# Patient Record
Sex: MALE | Race: WHITE | NOT HISPANIC OR LATINO | Employment: FULL TIME | URBAN - METROPOLITAN AREA
[De-identification: names, ages, dates, MRNs, and addresses within clinical notes are randomized per-mention and may not be internally consistent; named-entity substitution may affect disease eponyms.]

---

## 2019-08-20 ENCOUNTER — OFFICE VISIT (OUTPATIENT)
Dept: UROLOGY | Facility: AMBULATORY SURGERY CENTER | Age: 59
End: 2019-08-20
Payer: COMMERCIAL

## 2019-08-20 VITALS
BODY MASS INDEX: 33.46 KG/M2 | HEIGHT: 71 IN | WEIGHT: 239 LBS | SYSTOLIC BLOOD PRESSURE: 120 MMHG | DIASTOLIC BLOOD PRESSURE: 90 MMHG | HEART RATE: 80 BPM

## 2019-08-20 DIAGNOSIS — R31.29 MICROHEMATURIA: Primary | ICD-10-CM

## 2019-08-20 DIAGNOSIS — R35.1 BPH ASSOCIATED WITH NOCTURIA: ICD-10-CM

## 2019-08-20 DIAGNOSIS — N40.1 BPH ASSOCIATED WITH NOCTURIA: ICD-10-CM

## 2019-08-20 LAB
BACTERIA UR QL AUTO: ABNORMAL /HPF
BILIRUB UR QL STRIP: NEGATIVE
CLARITY UR: CLEAR
COLOR UR: YELLOW
GLUCOSE UR STRIP-MCNC: ABNORMAL MG/DL
HGB UR QL STRIP.AUTO: NEGATIVE
HYALINE CASTS #/AREA URNS LPF: ABNORMAL /LPF
KETONES UR STRIP-MCNC: NEGATIVE MG/DL
LEUKOCYTE ESTERASE UR QL STRIP: ABNORMAL
NITRITE UR QL STRIP: NEGATIVE
NON-SQ EPI CELLS URNS QL MICRO: ABNORMAL /HPF
PH UR STRIP.AUTO: 6 [PH]
PROT UR STRIP-MCNC: ABNORMAL MG/DL
RBC #/AREA URNS AUTO: ABNORMAL /HPF
SL AMB  POCT GLUCOSE, UA: 2000
SL AMB LEUKOCYTE ESTERASE,UA: NORMAL
SL AMB POCT BILIRUBIN,UA: NORMAL
SL AMB POCT BLOOD,UA: NORMAL
SL AMB POCT CLARITY,UA: CLEAR
SL AMB POCT COLOR,UA: YELLOW
SL AMB POCT KETONES,UA: NORMAL
SL AMB POCT NITRITE,UA: NORMAL
SL AMB POCT PH,UA: 5
SL AMB POCT SPECIFIC GRAVITY,UA: 1.02
SL AMB POCT URINE PROTEIN: 30
SL AMB POCT UROBILINOGEN: NORMAL
SP GR UR STRIP.AUTO: 1.04 (ref 1–1.03)
UROBILINOGEN UR QL STRIP.AUTO: 0.2 E.U./DL
WBC #/AREA URNS AUTO: ABNORMAL /HPF

## 2019-08-20 PROCEDURE — 81001 URINALYSIS AUTO W/SCOPE: CPT | Performed by: NURSE PRACTITIONER

## 2019-08-20 PROCEDURE — 81002 URINALYSIS NONAUTO W/O SCOPE: CPT | Performed by: NURSE PRACTITIONER

## 2019-08-20 PROCEDURE — 87086 URINE CULTURE/COLONY COUNT: CPT | Performed by: NURSE PRACTITIONER

## 2019-08-20 PROCEDURE — 99202 OFFICE O/P NEW SF 15 MIN: CPT | Performed by: NURSE PRACTITIONER

## 2019-08-20 RX ORDER — LISINOPRIL 10 MG/1
TABLET ORAL
COMMUNITY
Start: 2011-05-20

## 2019-08-20 RX ORDER — SIMVASTATIN 10 MG
TABLET ORAL
COMMUNITY
Start: 2011-07-03

## 2019-08-20 RX ORDER — PIOGLITAZONEHYDROCHLORIDE 45 MG/1
45 TABLET ORAL DAILY
COMMUNITY

## 2019-08-20 RX ORDER — TAMSULOSIN HYDROCHLORIDE 0.4 MG/1
0.4 CAPSULE ORAL
Qty: 90 CAPSULE | Refills: 1 | Status: SHIPPED | OUTPATIENT
Start: 2019-08-20 | End: 2019-09-24 | Stop reason: SDUPTHER

## 2019-08-20 NOTE — PATIENT INSTRUCTIONS
Flomax 0 4 mg daily- take in the evening  Continue with maintaining hydration  Call the office for concerning symptoms

## 2019-08-20 NOTE — PROGRESS NOTES
8/20/2019    Chief Complaint   Patient presents with    Microhematuria     Assessment and Plan    63 y o  male managed by Dr Eugenia Gann    1  Microscopic hematuria  · Urinalysis with microscopy and urine culture ordered  · Pending results of urine testing will escalate care to CT of abdomen and pelvis versus ultrasound    2  Benign prostatic hyperplasia with lower urinary tract symptoms  · Digital rectal exam reveals prostate size of approximately 45 g  Smooth, nontender, symmetrical, no nodules noted  · Tamsulosin 0 4 mg p o  Daily- prescription provided  · Bladder scan PVR on next office visit    3  Routine prostate cancer screening  · Patient reports PSA obtained by his PCP  · Digital rectal exam as above    History of Present Illness  Clarissa Houston is a 63 y o  male here for evaluation of  questionable microscopic hematuria and benign prostatic hyperplasia with lower urinary tract symptoms  Patient has a complaint of urinary frequency, urgency and nocturia  Patient reports getting up approximately 5-6 times at night to urinate  Patient denies dysuria, hematuria and sensation of incomplete bladder emptying  Patient reports urine testing positive for microscopic hematuria performed by PCP  He reports that he was sent here for further evaluation  Will send urinalysis with microscopy and urine culture  Patient agreeable to this plan  Patient denies smoking, ETOH and drug use/abuse  Patient denies a familial history of urothelial carcinomas  Patient does report a history of hypertension and diabetes for which he is currently treated for with lisinopril, metformin, Januvia and pioglitazone, respectively  Review of Systems   Constitutional: Negative for chills and fever  Respiratory: Negative for cough and shortness of breath  Cardiovascular: Negative for chest pain  Gastrointestinal: Negative for abdominal distention, abdominal pain, blood in stool, nausea and vomiting     Genitourinary: Positive for frequency and urgency  Negative for difficulty urinating, dysuria, enuresis, flank pain and hematuria  Musculoskeletal: Negative for back pain  Skin: Negative for rash  Neurological: Negative for dizziness  Urinary Incontinence Screening      Most Recent Value   Urinary Incontinence   Urinary Incontinence? No   Incomplete emptying? No   Urinary frequency? Yes   Urinary urgency? Yes   Urinary hesitancy? No   Dysuria (painful difficult urination)? No   Nocturia (waking up to use the bathroom)? Yes [every 2 hours]   Straining (having to push to go)? No   Weak stream?  Yes   Intermittent stream?  No   Post void dribbling? No          AUA SYMPTOM SCORE      Most Recent Value   AUA SYMPTOM SCORE   How often have you had a sensation of not emptying your bladder completely after you finished urinating? 3   How often have you had to urinate again less than two hours after you finished urinating? 4   How often have you found you stopped and started again several times when you urinate? 2   How often have you found it difficult to postpone urination? 0   How often have you had a weak urinary stream?  4   How often have you had to push or strain to begin urination? 0   How many times did you most typically get up to urinate from the time you went to bed at night until the time you got up in the morning? 4   Quality of Life: If you were to spend the rest of your life with your urinary condition just the way it is now, how would you feel about that?  4   AUA SYMPTOM SCORE  17         Past Medical History  History reviewed  No pertinent past medical history  Past Social History  History reviewed  No pertinent surgical history  Social History     Tobacco Use   Smoking Status Never Smoker   Smokeless Tobacco Former User    Types: Chew       Past Family History  History reviewed  No pertinent family history      Past Social history  Social History     Socioeconomic History    Marital status: /Civil Union     Spouse name: Not on file    Number of children: Not on file    Years of education: Not on file    Highest education level: Not on file   Occupational History    Not on file   Social Needs    Financial resource strain: Not on file    Food insecurity:     Worry: Not on file     Inability: Not on file    Transportation needs:     Medical: Not on file     Non-medical: Not on file   Tobacco Use    Smoking status: Never Smoker    Smokeless tobacco: Former User     Types: Chew   Substance and Sexual Activity    Alcohol use: Not on file    Drug use: Not on file    Sexual activity: Not on file   Lifestyle    Physical activity:     Days per week: Not on file     Minutes per session: Not on file    Stress: Not on file   Relationships    Social connections:     Talks on phone: Not on file     Gets together: Not on file     Attends Orthodoxy service: Not on file     Active member of club or organization: Not on file     Attends meetings of clubs or organizations: Not on file     Relationship status: Not on file    Intimate partner violence:     Fear of current or ex partner: Not on file     Emotionally abused: Not on file     Physically abused: Not on file     Forced sexual activity: Not on file   Other Topics Concern    Not on file   Social History Narrative    Not on file       Current Medications  Current Outpatient Medications   Medication Sig Dispense Refill    lisinopril (ZESTRIL) 10 mg tablet Take by mouth      metFORMIN (GLUCOPHAGE) 500 mg tablet Take by mouth      pioglitazone (ACTOS) 45 mg tablet Take 45 mg by mouth daily      simvastatin (ZOCOR) 10 mg tablet Take by mouth      sitaGLIPtin (JANUVIA) 50 mg tablet Take 50 mg by mouth daily      tamsulosin (FLOMAX) 0 4 mg Take 1 capsule (0 4 mg total) by mouth daily with dinner 90 capsule 1     No current facility-administered medications for this visit          Allergies  No Known Allergies      The following portions of the patient's history were reviewed and updated as appropriate: allergies, current medications, past medical history, past social history, past surgical history and problem list       Vitals  Vitals:    08/20/19 0937   BP: 120/90   Pulse: 80   Weight: 108 kg (239 lb)   Height: 5' 11" (1 803 m)     Physical Exam  Physical Exam   Constitutional: He is oriented to person, place, and time  He appears well-developed and well-nourished  No distress  HENT:   Head: Atraumatic  Eyes: EOM are normal    Neck: Normal range of motion  Neck supple  Cardiovascular: Normal rate and regular rhythm  No murmur heard  Pulmonary/Chest: Effort normal and breath sounds normal  No respiratory distress  Genitourinary: Rectum normal  Rectal exam shows no external hemorrhoid and no tenderness  Prostate is enlarged  Prostate is not tender  Genitourinary Comments: Digital rectal exam reveals a prostate size of approximately 45 g, smooth, firm, symmetrical   No nodules noted  Musculoskeletal: Normal range of motion  Neurological: He is alert and oriented to person, place, and time  Skin: Skin is warm and dry  Psychiatric: He has a normal mood and affect  Vitals reviewed  Results  No results found for this or any previous visit (from the past 1 hour(s))    No results found for: PSA  No results found for: GLUCOSE, CALCIUM, NA, K, CO2, CL, BUN, CREATININE  No results found for: WBC, HGB, HCT, MCV, PLT    Orders  Orders Placed This Encounter   Procedures    Urine culture    Urinalysis with microscopic    POCT urine dip       Tamiko SamplesKUMAR

## 2019-08-21 LAB — BACTERIA UR CULT: NORMAL

## 2019-09-13 LAB
APPEARANCE UR: CLEAR
BACTERIA UR QL AUTO: ABNORMAL /HPF
BILIRUB UR QL STRIP: NEGATIVE
COLOR UR: YELLOW
GLUCOSE UR QL STRIP: ABNORMAL
HGB UR QL STRIP: NEGATIVE
HYALINE CASTS #/AREA URNS LPF: ABNORMAL /LPF
KETONES UR QL STRIP: NEGATIVE
LEUKOCYTE ESTERASE UR QL STRIP: NEGATIVE
NITRITE UR QL STRIP: NEGATIVE
PH UR STRIP: 6.5 [PH] (ref 5–8)
PROT UR QL STRIP: NEGATIVE
RBC #/AREA URNS HPF: ABNORMAL /HPF
SP GR UR STRIP: 1.01 (ref 1–1.03)
SQUAMOUS #/AREA URNS HPF: ABNORMAL /HPF
WBC #/AREA URNS HPF: ABNORMAL /HPF

## 2019-09-24 ENCOUNTER — OFFICE VISIT (OUTPATIENT)
Dept: UROLOGY | Facility: AMBULATORY SURGERY CENTER | Age: 59
End: 2019-09-24
Payer: COMMERCIAL

## 2019-09-24 VITALS
WEIGHT: 244 LBS | HEART RATE: 74 BPM | HEIGHT: 71 IN | SYSTOLIC BLOOD PRESSURE: 114 MMHG | BODY MASS INDEX: 34.16 KG/M2 | DIASTOLIC BLOOD PRESSURE: 68 MMHG

## 2019-09-24 DIAGNOSIS — R35.1 BPH ASSOCIATED WITH NOCTURIA: ICD-10-CM

## 2019-09-24 DIAGNOSIS — N40.1 BPH ASSOCIATED WITH NOCTURIA: ICD-10-CM

## 2019-09-24 DIAGNOSIS — Z12.5 PROSTATE CANCER SCREENING: ICD-10-CM

## 2019-09-24 DIAGNOSIS — R31.29 MICROHEMATURIA: Primary | ICD-10-CM

## 2019-09-24 LAB — POST-VOID RESIDUAL VOLUME, ML POC: 165 ML

## 2019-09-24 PROCEDURE — 99213 OFFICE O/P EST LOW 20 MIN: CPT | Performed by: NURSE PRACTITIONER

## 2019-09-24 PROCEDURE — 51798 US URINE CAPACITY MEASURE: CPT | Performed by: NURSE PRACTITIONER

## 2019-09-24 RX ORDER — TAMSULOSIN HYDROCHLORIDE 0.4 MG/1
0.4 CAPSULE ORAL
Qty: 90 CAPSULE | Refills: 3 | Status: SHIPPED | OUTPATIENT
Start: 2019-09-24 | End: 2020-04-02 | Stop reason: SDUPTHER

## 2019-09-24 NOTE — PROGRESS NOTES
9/24/2019      Chief Complaint   Patient presents with    Microhematuria     6 wk f/u- UA and UC done 9/12/19    Benign Prostatic Hypertrophy     Assessment and Plan    63 y o  male managed by Dr Chaya Mack    1  Microscopic hematuria  · Urinalysis with microscopy reveals no RBCs, UA and a negative blood  · Urine dip in office reveals  · No need to escalate care to CT scan of abdomen pelvis ultrasound at this time due to negative to blood on microscopy    2  Benign prostatic hyperplasia with lower urinary tract symptoms  · Tamsulosin 0 4 p o  Daily  · Bladder scan  ml  · Bladder scan PVR and uroflow at next office visit    3  Routine prostate cancer screening  · Prostate evaluation performed a prior office visit patient due for repeat PSA and SHEILA 09/2020    History of Present Illness  Marko Matthew is a 63 y o  male here for follow up evaluation of  questionable microscopic hematuria and benign prostatic hyperplasia with lower urinary tract symptoms  Patient has a complaint of urinary frequency, urgency and nocturia  Patient reports getting up approximately 5-6 times at night to urinate  Patient denies dysuria, hematuria and sensation of incomplete bladder emptying  Patient denies smoking, ETOH and drug use/abuse  Patient denies a familial history of urothelial carcinomas  Patient does report a history of hypertension and diabetes for which he is currently treated for with lisinopril, metformin, Januvia and pioglitazone, respectively  At his last office visit he was started on tamsulosin 0 4 mg p o  Daily  Patient reports significant resolution of urinary symptoms  He reports getting up approximately 1 time a night to urinate reports that over significant reduction in the amount of urinary frequency and urgency throughout the day and reports complete bladder emptying with urinating  He is very pleased with his urinary symptoms at this time      Review of Systems   Constitutional: Negative for chills and fever  Respiratory: Negative for cough and shortness of breath  Cardiovascular: Negative for chest pain  Gastrointestinal: Negative for abdominal distention, abdominal pain, blood in stool, nausea and vomiting  Genitourinary: Negative for difficulty urinating, dysuria, enuresis, flank pain, frequency, hematuria and urgency  Musculoskeletal: Negative for back pain  Skin: Negative for rash  Neurological: Negative for dizziness, syncope and light-headedness  Past Medical History  History reviewed  No pertinent past medical history  Past Social History  History reviewed  No pertinent surgical history  Social History     Tobacco Use   Smoking Status Never Smoker   Smokeless Tobacco Former User    Types: Chew       Past Family History  History reviewed  No pertinent family history      Past Social history  Social History     Socioeconomic History    Marital status: /Civil Union     Spouse name: Not on file    Number of children: Not on file    Years of education: Not on file    Highest education level: Not on file   Occupational History    Not on file   Social Needs    Financial resource strain: Not on file    Food insecurity:     Worry: Not on file     Inability: Not on file    Transportation needs:     Medical: Not on file     Non-medical: Not on file   Tobacco Use    Smoking status: Never Smoker    Smokeless tobacco: Former User     Types: Chew   Substance and Sexual Activity    Alcohol use: Not on file    Drug use: Not on file    Sexual activity: Not on file   Lifestyle    Physical activity:     Days per week: Not on file     Minutes per session: Not on file    Stress: Not on file   Relationships    Social connections:     Talks on phone: Not on file     Gets together: Not on file     Attends Latter day service: Not on file     Active member of club or organization: Not on file     Attends meetings of clubs or organizations: Not on file     Relationship status: Not on file    Intimate partner violence:     Fear of current or ex partner: Not on file     Emotionally abused: Not on file     Physically abused: Not on file     Forced sexual activity: Not on file   Other Topics Concern    Not on file   Social History Narrative    Not on file       Current Medications  Current Outpatient Medications   Medication Sig Dispense Refill    lisinopril (ZESTRIL) 10 mg tablet Take by mouth      metFORMIN (GLUCOPHAGE) 500 mg tablet Take by mouth      pioglitazone (ACTOS) 45 mg tablet Take 45 mg by mouth daily      simvastatin (ZOCOR) 10 mg tablet Take by mouth      sitaGLIPtin (JANUVIA) 50 mg tablet Take 50 mg by mouth daily      tamsulosin (FLOMAX) 0 4 mg Take 1 capsule (0 4 mg total) by mouth daily with dinner 90 capsule 1     No current facility-administered medications for this visit  Allergies  No Known Allergies      The following portions of the patient's history were reviewed and updated as appropriate: allergies, current medications, past medical history, past social history, past surgical history and problem list       Vitals  Vitals:    09/24/19 0837   BP: 114/68   BP Location: Left arm   Patient Position: Sitting   Cuff Size: Large   Pulse: 74   Weight: 111 kg (244 lb)   Height: 5' 11" (1 803 m)           Physical Exam  Physical Exam   Constitutional: He is oriented to person, place, and time  He appears well-developed and well-nourished  HENT:   Head: Atraumatic  Eyes: EOM are normal    Neck: Neck supple  Pulmonary/Chest: Effort normal  No respiratory distress  Musculoskeletal: Normal range of motion  Neurological: He is alert and oriented to person, place, and time  Skin: Skin is warm and dry  Psychiatric: He has a normal mood and affect  Vitals reviewed      Results  Recent Results (from the past 1 hour(s))   POCT Measure PVR    Collection Time: 09/24/19  8:36 AM   Result Value Ref Range    POST-VOID RESIDUAL VOLUME, ML  mL     No results found for: PSA  No results found for: GLUCOSE, CALCIUM, NA, K, CO2, CL, BUN, CREATININE  No results found for: WBC, HGB, HCT, MCV, PLT    Orders  Orders Placed This Encounter   Procedures    POCT Measure PVR       KUMAR Callejas

## 2020-04-02 DIAGNOSIS — N40.1 BPH ASSOCIATED WITH NOCTURIA: ICD-10-CM

## 2020-04-02 DIAGNOSIS — R35.1 BPH ASSOCIATED WITH NOCTURIA: ICD-10-CM

## 2020-04-02 RX ORDER — TAMSULOSIN HYDROCHLORIDE 0.4 MG/1
0.4 CAPSULE ORAL
Qty: 90 CAPSULE | Refills: 2 | Status: SHIPPED | OUTPATIENT
Start: 2020-04-02 | End: 2020-09-21 | Stop reason: SDUPTHER

## 2020-09-13 LAB — PSA SERPL-MCNC: 2.2 NG/ML

## 2020-09-21 ENCOUNTER — OFFICE VISIT (OUTPATIENT)
Dept: UROLOGY | Facility: AMBULATORY SURGERY CENTER | Age: 60
End: 2020-09-21
Payer: COMMERCIAL

## 2020-09-21 VITALS
HEIGHT: 71 IN | BODY MASS INDEX: 35.59 KG/M2 | TEMPERATURE: 98.4 F | HEART RATE: 78 BPM | DIASTOLIC BLOOD PRESSURE: 90 MMHG | WEIGHT: 254.2 LBS | SYSTOLIC BLOOD PRESSURE: 138 MMHG

## 2020-09-21 DIAGNOSIS — R35.1 BPH ASSOCIATED WITH NOCTURIA: Primary | ICD-10-CM

## 2020-09-21 DIAGNOSIS — Z12.5 PROSTATE CANCER SCREENING: ICD-10-CM

## 2020-09-21 DIAGNOSIS — N40.1 BPH ASSOCIATED WITH NOCTURIA: Primary | ICD-10-CM

## 2020-09-21 LAB
POST-VOID RESIDUAL VOLUME, ML POC: 14 ML
SL AMB  POCT GLUCOSE, UA: NORMAL
SL AMB LEUKOCYTE ESTERASE,UA: NORMAL
SL AMB POCT BILIRUBIN,UA: NORMAL
SL AMB POCT BLOOD,UA: NORMAL
SL AMB POCT CLARITY,UA: CLEAR
SL AMB POCT COLOR,UA: YELLOW
SL AMB POCT KETONES,UA: NORMAL
SL AMB POCT NITRITE,UA: NORMAL
SL AMB POCT PH,UA: 6
SL AMB POCT SPECIFIC GRAVITY,UA: 1.03
SL AMB POCT URINE PROTEIN: NORMAL
SL AMB POCT UROBILINOGEN: 0.2

## 2020-09-21 PROCEDURE — 81002 URINALYSIS NONAUTO W/O SCOPE: CPT | Performed by: NURSE PRACTITIONER

## 2020-09-21 PROCEDURE — 99213 OFFICE O/P EST LOW 20 MIN: CPT | Performed by: NURSE PRACTITIONER

## 2020-09-21 PROCEDURE — 51798 US URINE CAPACITY MEASURE: CPT | Performed by: NURSE PRACTITIONER

## 2020-09-21 RX ORDER — TAMSULOSIN HYDROCHLORIDE 0.4 MG/1
0.4 CAPSULE ORAL
Qty: 90 CAPSULE | Refills: 3 | Status: SHIPPED | OUTPATIENT
Start: 2020-09-21 | End: 2021-03-15

## 2020-09-21 NOTE — PROGRESS NOTES
9/21/2020      Chief Complaint   Patient presents with    Benign Prostatic Hypertrophy    Microhematuria       Assessment and Plan    62 y o  male managed by Dr Brower Breath    1  Benign prostatic hyperplasia  · Bladder scan PVR 14 mL  · Urine dip unremarkable  · Continue tamsulosin 0 4 mg p o  Daily-prescription refill provided  · Bladder scan PVR and uroflow at next office visit  · Follow up in the office in 1 year    2  Prostate cancer screening  · PSA performed 09/12/2020 resulted 2 2  · SHEILA reveals a prostate approximately 40 g smooth nontender  No nodules noted  Symmetrical  · Repeat PSA and SHEILA in 1 year        History of Present Illness  Francine Summers is a 62 y o  male here for follow up evaluation of  benign prostatic hyperplasia with lower urinary tract symptoms   Patient has a complaint of urinary frequency, urgency and nocturia   Patient reports getting up approximately 5-6 times at night to urinate   Patient denies dysuria, hematuria and sensation of incomplete bladder emptying   Patient denies smoking, ETOH and drug use/abuse   Patient denies a familial history of urothelial carcinomas   Patient does report a history of hypertension and diabetes for which he is currently treated for with lisinopril, metformin, Januvia and pioglitazone, respectively  At his last office visit he was started on tamsulosin 0 4 mg p o  Daily  Patient reports significant resolution of urinary symptoms  He reports getting up approximately 0-1 time a night to urinate He denies urinary frequency and urgency throughout the day and reports complete bladder emptying with urinating  He is very pleased with his urinary symptoms at this time  Review of Systems   Constitutional: Negative for chills and fever  Respiratory: Negative for cough and shortness of breath  Cardiovascular: Negative for chest pain  Gastrointestinal: Negative for abdominal distention, abdominal pain, blood in stool, nausea and vomiting  Genitourinary: Negative for difficulty urinating, dysuria, enuresis, flank pain, frequency, hematuria and urgency  Skin: Negative for rash  Urinary Incontinence Screening      Most Recent Value   Urinary Incontinence   Urinary Incontinence? No   Incomplete emptying? No   Urinary frequency? Yes   Urinary urgency? No   Urinary hesitancy? No   Dysuria (painful difficult urination)? No   Nocturia (waking up to use the bathroom)? Yes [1x]   Straining (having to push to go)? No   Weak stream?  Yes   Intermittent stream?  No          AUA SYMPTOM SCORE      Most Recent Value   AUA SYMPTOM SCORE   How often have you had a sensation of not emptying your bladder completely after you finished urinating? 0   How often have you had to urinate again less than two hours after you finished urinating? 1   How often have you found you stopped and started again several times when you urinate?  0   How often have you found it difficult to postpone urination? 0   How often have you had a weak urinary stream?  1   How often have you had to push or strain to begin urination? 0   How many times did you most typically get up to urinate from the time you went to bed at night until the time you got up in the morning? 1   Quality of Life: If you were to spend the rest of your life with your urinary condition just the way it is now, how would you feel about that?  1   AUA SYMPTOM SCORE  3             Past Medical History  History reviewed  No pertinent past medical history  Past Social History  History reviewed  No pertinent surgical history  Social History     Tobacco Use   Smoking Status Never Smoker   Smokeless Tobacco Former User    Types: Chew       Past Family History  History reviewed  No pertinent family history      Past Social history  Social History     Socioeconomic History    Marital status: /Civil Union     Spouse name: Not on file    Number of children: Not on file    Years of education: Not on file    Highest education level: Not on file   Occupational History    Not on file   Social Needs    Financial resource strain: Not on file    Food insecurity     Worry: Not on file     Inability: Not on file    Transportation needs     Medical: Not on file     Non-medical: Not on file   Tobacco Use    Smoking status: Never Smoker    Smokeless tobacco: Former User     Types: Chew   Substance and Sexual Activity    Alcohol use: Not on file    Drug use: Not on file    Sexual activity: Not on file   Lifestyle    Physical activity     Days per week: Not on file     Minutes per session: Not on file    Stress: Not on file   Relationships    Social connections     Talks on phone: Not on file     Gets together: Not on file     Attends Anglican service: Not on file     Active member of club or organization: Not on file     Attends meetings of clubs or organizations: Not on file     Relationship status: Not on file    Intimate partner violence     Fear of current or ex partner: Not on file     Emotionally abused: Not on file     Physically abused: Not on file     Forced sexual activity: Not on file   Other Topics Concern    Not on file   Social History Narrative    Not on file       Current Medications  Current Outpatient Medications   Medication Sig Dispense Refill    lisinopril (ZESTRIL) 10 mg tablet Take by mouth      metFORMIN (GLUCOPHAGE) 500 mg tablet Take by mouth      pioglitazone (ACTOS) 45 mg tablet Take 45 mg by mouth daily      simvastatin (ZOCOR) 10 mg tablet Take by mouth      sitaGLIPtin (JANUVIA) 50 mg tablet Take 50 mg by mouth daily      tamsulosin (FLOMAX) 0 4 mg Take 1 capsule (0 4 mg total) by mouth daily with dinner 90 capsule 2     No current facility-administered medications for this visit          Allergies  No Known Allergies      The following portions of the patient's history were reviewed and updated as appropriate: allergies, current medications, past medical history, past social history, past surgical history and problem list       Vitals  Vitals:    09/21/20 0845   BP: 138/90   BP Location: Left arm   Patient Position: Sitting   Cuff Size: Adult   Pulse: 78   Temp: 98 4 °F (36 9 °C)   Weight: 115 kg (254 lb 3 2 oz)   Height: 5' 11" (1 803 m)     Physical Exam  Physical Exam  Vitals signs reviewed  Constitutional:       General: He is not in acute distress  Appearance: Normal appearance  He is normal weight  HENT:      Head: Normocephalic  Eyes:      Pupils: Pupils are equal, round, and reactive to light  Cardiovascular:      Rate and Rhythm: Normal rate  Pulmonary:      Effort: No respiratory distress  Breath sounds: Normal breath sounds  Genitourinary:     Comments: SHEILA reveals a prostate size of approximately 40 g smooth nontender  No nodules noted  Skin:     General: Skin is warm and dry  Neurological:      General: No focal deficit present  Mental Status: He is alert and oriented to person, place, and time     Psychiatric:         Mood and Affect: Mood normal          Behavior: Behavior normal            Results  Recent Results (from the past 1 hour(s))   POCT Measure PVR    Collection Time: 09/21/20  8:38 AM   Result Value Ref Range    POST-VOID RESIDUAL VOLUME, ML POC 14 mL   POCT urine dip    Collection Time: 09/21/20  8:44 AM   Result Value Ref Range    LEUKOCYTE ESTERASE,UA -     NITRITE,UA -     SL AMB POCT UROBILINOGEN 0 2     POCT URINE PROTEIN -      PH,UA 6 0     BLOOD,UA -     SPECIFIC GRAVITY,UA 1 030     KETONES,UA -     BILIRUBIN,UA -     GLUCOSE, UA -      COLOR,UA yellow     CLARITY,UA clear    ]  Lab Results   Component Value Date    PSA 2 2 09/12/2020     No results found for: GLUCOSE, CALCIUM, NA, K, CO2, CL, BUN, CREATININE  No results found for: WBC, HGB, HCT, MCV, PLT    Orders  Orders Placed This Encounter   Procedures    POCT Measure PVR    POCT urine dip       KUMAR Stark

## 2021-03-15 DIAGNOSIS — N40.1 BPH ASSOCIATED WITH NOCTURIA: ICD-10-CM

## 2021-03-15 DIAGNOSIS — R35.1 BPH ASSOCIATED WITH NOCTURIA: ICD-10-CM

## 2021-03-15 RX ORDER — TAMSULOSIN HYDROCHLORIDE 0.4 MG/1
CAPSULE ORAL
Qty: 90 CAPSULE | Refills: 2 | Status: SHIPPED | OUTPATIENT
Start: 2021-03-15 | End: 2021-12-02 | Stop reason: SDUPTHER

## 2021-09-12 LAB — PSA SERPL-MCNC: 1.6 NG/ML

## 2021-09-22 ENCOUNTER — OFFICE VISIT (OUTPATIENT)
Dept: UROLOGY | Facility: AMBULATORY SURGERY CENTER | Age: 61
End: 2021-09-22
Payer: COMMERCIAL

## 2021-09-22 VITALS
HEART RATE: 80 BPM | BODY MASS INDEX: 36.82 KG/M2 | DIASTOLIC BLOOD PRESSURE: 82 MMHG | SYSTOLIC BLOOD PRESSURE: 128 MMHG | WEIGHT: 263 LBS | HEIGHT: 71 IN

## 2021-09-22 DIAGNOSIS — R35.1 BPH ASSOCIATED WITH NOCTURIA: Primary | ICD-10-CM

## 2021-09-22 DIAGNOSIS — N40.1 BPH ASSOCIATED WITH NOCTURIA: Primary | ICD-10-CM

## 2021-09-22 DIAGNOSIS — Z12.5 PROSTATE CANCER SCREENING: ICD-10-CM

## 2021-09-22 LAB — POST-VOID RESIDUAL VOLUME, ML POC: 72 ML

## 2021-09-22 PROCEDURE — 99213 OFFICE O/P EST LOW 20 MIN: CPT | Performed by: NURSE PRACTITIONER

## 2021-09-22 PROCEDURE — 51798 US URINE CAPACITY MEASURE: CPT | Performed by: NURSE PRACTITIONER

## 2021-09-22 RX ORDER — SIMVASTATIN 10 MG
TABLET ORAL
COMMUNITY
Start: 2021-08-15

## 2021-09-22 RX ORDER — LISINOPRIL 10 MG/1
TABLET ORAL
COMMUNITY
Start: 2021-08-15

## 2021-09-22 NOTE — PROGRESS NOTES
9/22/2021    Assessment and Plan    62 y o  male managed by Dr Daisha Yap    1  Benign prostatic hyperplasia  ·  bladder scan PVR 72 ml  ·  continue tamsulosin- prescription refill not needed at this time  ·  continue dietary behavior modifications to reduce irritative bladder symptoms  ·  with continue to monitor for progression/worsening of symptoms    2  Prostate cancer screening  ·  PSA performed 09/11/2021 resulted 1 6  ·  SHEILA- prostate 45 g smooth nontender  No nodules noted  ·  repeat PSA and SHEILA in 1 year  ·  follow up in the office in 1 year    History of Present Illness  Lyndon Durbin is a 62 y o  male here for follow up evaluation of benign prostatic hyperplasia with lower urinary tract symptoms   Patient has a complaint of urinary frequency, urgency and nocturia   Patient reports getting up approximately 5-6 times at night to urinate   Patient denies dysuria, hematuria and sensation of incomplete bladder emptying   Patient denies smoking, ETOH and drug use/abuse   Patient denies a familial history of urothelial carcinomas   Patient does report a history of hypertension and diabetes for which he is currently treated for with lisinopril, metformin, Januvia and pioglitazone, respectively   At his last office visit he was started on tamsulosin 0 4 mg p o  Daily   Patient reports significant resolution of urinary symptoms   He reports getting up approximately 0-1 time a night to urinate He denies urinary frequency and urgency throughout the day and reports complete bladder emptying with urinating  Og Oropeza is very pleased with his urinary symptoms at this time  Component PSA, Total   Latest Ref Rng & Units < OR = 4 0 ng/mL   9/12/2020 2 2   9/11/2021 1 6       Review of Systems   Constitutional: Negative for chills and fever  Respiratory: Negative for cough and shortness of breath  Cardiovascular: Negative for chest pain     Gastrointestinal: Negative for abdominal distention, abdominal pain, blood in stool, nausea and vomiting  Genitourinary: Negative for difficulty urinating, dysuria, enuresis, flank pain, frequency, hematuria and urgency  Skin: Negative for rash  Past Medical History  History reviewed  No pertinent past medical history  Past Social History  History reviewed  No pertinent surgical history  Social History     Tobacco Use   Smoking Status Never Smoker   Smokeless Tobacco Former User    Types: Chew       Past Family History  History reviewed  No pertinent family history  Past Social history  Social History     Socioeconomic History    Marital status: /Civil Union     Spouse name: Not on file    Number of children: Not on file    Years of education: Not on file    Highest education level: Not on file   Occupational History    Not on file   Tobacco Use    Smoking status: Never Smoker    Smokeless tobacco: Former User     Types: Chew   Substance and Sexual Activity    Alcohol use: Not on file    Drug use: Not on file    Sexual activity: Not on file   Other Topics Concern    Not on file   Social History Narrative    Not on file     Social Determinants of Health     Financial Resource Strain:     Difficulty of Paying Living Expenses:    Food Insecurity:     Worried About [address] in the Last Year:     [address] in the Last Year:    Transportation Needs:     Lack of Transportation (Medical):      Lack of Transportation (Non-Medical):    Physical Activity:     Days of Exercise per Week:     Minutes of Exercise per Session:    Stress:     Feeling of Stress :    Social Connections:     Frequency of Communication with Friends and Family:     Frequency of Social Gatherings with Friends and Family:     Attends Sikh Services:     Active Member of Clubs or Organizations:     Attends Club or Organization Meetings:     Marital Status:    Intimate Partner Violence:     Fear of Current or Ex-Partner:     Emotionally Abused:     Physically Abused:     Sexually Abused:        Current Medications  Current Outpatient Medications   Medication Sig Dispense Refill    lisinopril (ZESTRIL) 10 mg tablet Take by mouth      lisinopril (ZESTRIL) 10 mg tablet TAKE 1 TABLET ONCE DAILY      metFORMIN (GLUCOPHAGE) 500 mg tablet Take by mouth      metFORMIN (GLUCOPHAGE) 500 mg tablet TAKE 1 TABLET TWICE A DAY      pioglitazone (ACTOS) 45 mg tablet Take 45 mg by mouth daily      simvastatin (ZOCOR) 10 mg tablet Take by mouth      simvastatin (Zocor) 10 mg tablet TAKE 1 TABLET ONCE DAILY      sitaGLIPtin (JANUVIA) 50 mg tablet Take 50 mg by mouth daily      tamsulosin (FLOMAX) 0 4 mg TAKE 1 CAPSULE DAILY WITH  DINNER 90 capsule 2     No current facility-administered medications for this visit  Allergies  No Known Allergies      The following portions of the patient's history were reviewed and updated as appropriate: allergies, current medications, past medical history, past social history, past surgical history and problem list       Vitals  Vitals:    09/22/21 0809   BP: 128/82   Pulse: 80   Weight: 119 kg (263 lb)   Height: 5' 11" (1 803 m)           Physical Exam  Physical Exam  Vitals reviewed  Constitutional:       General: He is not in acute distress  Appearance: Normal appearance  He is normal weight  HENT:      Head: Normocephalic  Eyes:      Pupils: Pupils are equal, round, and reactive to light  Cardiovascular:      Rate and Rhythm: Normal rate  Pulmonary:      Effort: No respiratory distress  Breath sounds: Normal breath sounds  Genitourinary:     Penis: Normal        Testes: Normal       Comments:  SHEILA -prostate 45 g smooth nontender  No nodules noted  Skin:     General: Skin is warm and dry  Neurological:      General: No focal deficit present  Mental Status: He is alert and oriented to person, place, and time     Psychiatric:         Mood and Affect: Mood normal          Behavior: Behavior normal  Results  Recent Results (from the past 1 hour(s))   POCT Measure PVR    Collection Time: 09/22/21  8:06 AM   Result Value Ref Range    POST-VOID RESIDUAL VOLUME, ML POC 72 mL   ]  Lab Results   Component Value Date    PSA 1 6 09/11/2021    PSA 2 2 09/12/2020     No results found for: GLUCOSE, CALCIUM, NA, K, CO2, CL, BUN, CREATININE  No results found for: WBC, HGB, HCT, MCV, PLT        Orders  Orders Placed This Encounter   Procedures    PSA, Total Screen     This is a patient instruction: This test is non-fasting  Please drink two glasses of water morning of bloodwork          Standing Status:   Future     Standing Expiration Date:   3/22/2023   Suzie Burks POCT Measure PVR       KUMAR Muhammad

## 2021-12-02 DIAGNOSIS — R35.1 BPH ASSOCIATED WITH NOCTURIA: ICD-10-CM

## 2021-12-02 DIAGNOSIS — N40.1 BPH ASSOCIATED WITH NOCTURIA: ICD-10-CM

## 2021-12-02 RX ORDER — TAMSULOSIN HYDROCHLORIDE 0.4 MG/1
CAPSULE ORAL
Qty: 90 CAPSULE | Refills: 2 | Status: SHIPPED | OUTPATIENT
Start: 2021-12-02 | End: 2022-01-07 | Stop reason: SDUPTHER

## 2022-01-07 DIAGNOSIS — R35.1 BPH ASSOCIATED WITH NOCTURIA: ICD-10-CM

## 2022-01-07 DIAGNOSIS — N40.1 BPH ASSOCIATED WITH NOCTURIA: ICD-10-CM

## 2022-01-07 RX ORDER — TAMSULOSIN HYDROCHLORIDE 0.4 MG/1
0.4 CAPSULE ORAL
Qty: 90 CAPSULE | Refills: 0 | Status: SHIPPED | OUTPATIENT
Start: 2022-01-07 | End: 2022-07-06

## 2022-07-06 DIAGNOSIS — N40.1 BPH ASSOCIATED WITH NOCTURIA: ICD-10-CM

## 2022-07-06 DIAGNOSIS — R35.1 BPH ASSOCIATED WITH NOCTURIA: ICD-10-CM

## 2022-07-06 RX ORDER — TAMSULOSIN HYDROCHLORIDE 0.4 MG/1
CAPSULE ORAL
Qty: 90 CAPSULE | Refills: 0 | Status: SHIPPED | OUTPATIENT
Start: 2022-07-06 | End: 2022-10-11

## 2022-09-18 LAB — PSA SERPL-MCNC: 1.71 NG/ML

## 2022-10-10 DIAGNOSIS — N40.1 BPH ASSOCIATED WITH NOCTURIA: ICD-10-CM

## 2022-10-10 DIAGNOSIS — R35.1 BPH ASSOCIATED WITH NOCTURIA: ICD-10-CM

## 2022-10-11 ENCOUNTER — OFFICE VISIT (OUTPATIENT)
Dept: UROLOGY | Facility: AMBULATORY SURGERY CENTER | Age: 62
End: 2022-10-11

## 2022-10-11 VITALS
HEART RATE: 82 BPM | OXYGEN SATURATION: 98 % | HEIGHT: 71 IN | WEIGHT: 263 LBS | SYSTOLIC BLOOD PRESSURE: 132 MMHG | RESPIRATION RATE: 18 BRPM | DIASTOLIC BLOOD PRESSURE: 86 MMHG | BODY MASS INDEX: 36.82 KG/M2

## 2022-10-11 DIAGNOSIS — R35.1 BPH ASSOCIATED WITH NOCTURIA: ICD-10-CM

## 2022-10-11 DIAGNOSIS — Z12.5 PROSTATE CANCER SCREENING: Primary | ICD-10-CM

## 2022-10-11 DIAGNOSIS — N40.1 BPH ASSOCIATED WITH NOCTURIA: ICD-10-CM

## 2022-10-11 RX ORDER — TAMSULOSIN HYDROCHLORIDE 0.4 MG/1
0.4 CAPSULE ORAL
Qty: 90 CAPSULE | Refills: 3 | Status: SHIPPED | OUTPATIENT
Start: 2022-10-11

## 2022-10-11 RX ORDER — TAMSULOSIN HYDROCHLORIDE 0.4 MG/1
CAPSULE ORAL
Qty: 90 CAPSULE | Refills: 0 | Status: SHIPPED | OUTPATIENT
Start: 2022-10-11 | End: 2022-10-11 | Stop reason: SDUPTHER

## 2022-10-11 NOTE — PROGRESS NOTES
10/11/2022      Chief Complaint   Patient presents with   • Benign Prostatic Hypertrophy     Assessment and Plan    65 y o  male managed by Dr Anayeli Mohamud    1  Benign Prostatic hyperplasia  · Continue tamsulosin-prescription refill provided  · Will continue to monitor for worse/progression of lower urinary tract symptoms    2  Prostate cancer screening  · PSA performed 09/17/2022 resulted 1 71  · SHEILA-45 g prostate with no nodules  Smooth symmetrical   Nontender  · Repeat PSA and SHEILA in 1 year  · Follow up in the office in 1 year    History of Present Illness  Kelsie Mix is a 65 y o  male here for follow up evaluation of  benign prostatic hyperplasia with lower urinary tract symptoms   Patient has a complaint of urinary frequency, urgency and nocturia   Patient reports getting up approximately 5-6 times at night to urinate   Patient denies dysuria, hematuria and sensation of incomplete bladder emptying   Patient denies smoking, ETOH and drug use/abuse   Patient denies a familial history of urothelial carcinomas   Patient does report a history of hypertension and diabetes for which he is currently treated for with lisinopril, metformin, Januvia and pioglitazone, respectively   At his last office visit he was started on tamsulosin 0 4 mg p o  Daily   Patient reports significant resolution of urinary symptoms   He reports getting up approximately 0-1 time a night to urinate He denies urinary frequency and urgency throughout the day and reports complete bladder emptying with urinating  Maicolpierre Jones is very pleased with his urinary symptoms at this time  Component       PSA, Total   Latest Ref Rng & Units       < OR = 4 00 ng/mL   9/12/2020      8:29 AM 2 2   9/11/2021      9:55 AM 1 6   9/17/2022      7:06 AM 1 71     Review of Systems   Constitutional: Negative for chills and fever  Respiratory: Negative for cough and shortness of breath  Cardiovascular: Negative for chest pain     Gastrointestinal: Negative for abdominal distention, abdominal pain, blood in stool, nausea and vomiting  Genitourinary: Negative for difficulty urinating, dysuria, enuresis, flank pain, frequency, hematuria and urgency  Skin: Negative for rash  Past Medical History  History reviewed  No pertinent past medical history  Past Social History  History reviewed  No pertinent surgical history  Social History     Tobacco Use   Smoking Status Never Smoker   Smokeless Tobacco Former User   • Types: Chew       Past Family History  History reviewed  No pertinent family history      Past Social history  Social History     Socioeconomic History   • Marital status: /Civil Union     Spouse name: Not on file   • Number of children: Not on file   • Years of education: Not on file   • Highest education level: Not on file   Occupational History   • Not on file   Tobacco Use   • Smoking status: Never Smoker   • Smokeless tobacco: Former User     Types: Chew   Substance and Sexual Activity   • Alcohol use: Not on file   • Drug use: Not on file   • Sexual activity: Not on file   Other Topics Concern   • Not on file   Social History Narrative   • Not on file     Social Determinants of Health     Financial Resource Strain: Not on file   Food Insecurity: Not on file   Transportation Needs: Not on file   Physical Activity: Not on file   Stress: Not on file   Social Connections: Not on file   Intimate Partner Violence: Not on file   Housing Stability: Not on file       Current Medications  Current Outpatient Medications   Medication Sig Dispense Refill   • lisinopril (ZESTRIL) 10 mg tablet Take by mouth     • lisinopril (ZESTRIL) 10 mg tablet TAKE 1 TABLET ONCE DAILY     • metFORMIN (GLUCOPHAGE) 500 mg tablet Take by mouth     • metFORMIN (GLUCOPHAGE) 500 mg tablet TAKE 1 TABLET TWICE A DAY     • pioglitazone (ACTOS) 45 mg tablet Take 45 mg by mouth daily     • simvastatin (ZOCOR) 10 mg tablet Take by mouth     • simvastatin (ZOCOR) 10 mg tablet TAKE 1 TABLET ONCE DAILY     • sitaGLIPtin (JANUVIA) 50 mg tablet Take 50 mg by mouth daily     • tamsulosin (FLOMAX) 0 4 mg Take 1 capsule (0 4 mg total) by mouth daily with dinner 90 capsule 3     No current facility-administered medications for this visit  Allergies  No Known Allergies      The following portions of the patient's history were reviewed and updated as appropriate: allergies, current medications, past medical history, past social history, past surgical history and problem list       Vitals  Vitals:    10/11/22 1312   BP: 132/86   BP Location: Right arm   Patient Position: Sitting   Cuff Size: Standard   Pulse: 82   Resp: 18   SpO2: 98%   Weight: 119 kg (263 lb)   Height: 5' 11" (1 803 m)     Physical Exam  Physical Exam  Vitals reviewed  Constitutional:       General: He is not in acute distress  Appearance: Normal appearance  He is normal weight  HENT:      Head: Normocephalic  Cardiovascular:      Rate and Rhythm: Normal rate  Pulmonary:      Effort: No respiratory distress  Breath sounds: Normal breath sounds  Genitourinary:     Comments: SHEILA-prostate 45 g with no nodules  Skin:     General: Skin is warm and dry  Neurological:      General: No focal deficit present  Mental Status: He is alert and oriented to person, place, and time  Psychiatric:         Mood and Affect: Mood normal          Behavior: Behavior normal        Results  No results found for this or any previous visit (from the past 1 hour(s)) ]  Lab Results   Component Value Date    PSA 1 71 09/17/2022    PSA 1 6 09/11/2021    PSA 2 2 09/12/2020     No results found for: GLUCOSE, CALCIUM, NA, K, CO2, CL, BUN, CREATININE  No results found for: WBC, HGB, HCT, MCV, PLT    Orders  Orders Placed This Encounter   Procedures   • PSA, Total Screen     This is a patient instruction: This test is non-fasting  Please drink two glasses of water morning of bloodwork          Standing Status:   Future     Standing Expiration Date:   4/11/2024       KUMAR Bolton